# Patient Record
(demographics unavailable — no encounter records)

---

## 2025-04-11 NOTE — PHYSICAL EXAM
[No Acute Distress] : no acute distress [Normal Voice/Communication] : normal voice/communication [Normal Sclera/Conjunctiva] : normal sclera/conjunctiva [EOMI] : extraocular movements intact [Normal Outer Ear/Nose] : the outer ears and nose were normal in appearance [Supple] : supple [No Respiratory Distress] : no respiratory distress  [No Accessory Muscle Use] : no accessory muscle use [Clear to Auscultation] : lungs were clear to auscultation bilaterally [Normal Rate] : normal rate  [Regular Rhythm] : with a regular rhythm [Normal S1, S2] : normal S1 and S2 [No Edema] : there was no peripheral edema [Soft] : abdomen soft [Non Tender] : non-tender [Non-distended] : non-distended [No Joint Swelling] : no joint swelling [No Rash] : no rash [Normal Gait] : normal gait [Speech Grossly Normal] : speech grossly normal [Memory Grossly Normal] : memory grossly normal [Normal Affect] : the affect was normal [Normal Mood] : the mood was normal [Normal Insight/Judgement] : insight and judgment were intact [No Masses] : no abdominal mass palpated [No HSM] : no HSM [Acne] : no acne

## 2025-04-11 NOTE — REVIEW OF SYSTEMS
[Back Pain] : back pain [Fever] : no fever [Chills] : no chills [Discharge] : no discharge [Pain] : no pain [Redness] : no redness [Dryness] : no dryness [Itching] : no itching [Chest Pain] : no chest pain [Shortness Of Breath] : no shortness of breath [Abdominal Pain] : no abdominal pain [Dysuria] : no dysuria [Hematuria] : no hematuria [Joint Pain] : no joint pain [Skin Rash] : no skin rash [Headache] : no headache [Dizziness] : no dizziness [Suicidal] : not suicidal [Insomnia] : no insomnia [Anxiety] : no anxiety [Depression] : no depression [FreeTextEntry3] : New right eye floaters

## 2025-04-11 NOTE — HEALTH RISK ASSESSMENT
[Yes] : In the past 12 months have you used drugs other than those required for medical reasons? Yes [Never] : Never [2 - 4 times a month (2 pts)] : 2-4 times a month (2 points) [1 or 2 (0 pts)] : 1 or 2 (0 points) [Never (0 pts)] : Never (0 points) [Little interest or pleasure doing things] : 1) Little interest or pleasure doing things [Feeling down, depressed, or hopeless] : 2) Feeling down, depressed, or hopeless [0] : 2) Feeling down, depressed, or hopeless: Not at all (0) [PHQ-2 Negative - No further assessment needed] : PHQ-2 Negative - No further assessment needed [de-identified] : Glass of bourbon, beer, wine once in a while  [Audit-CScore] : 2 [de-identified] : Marijuana gummies PRN  [de-identified] : Sedentary but has bought a monitoring ring to measure steps and plans to walk more  [de-identified] : Eats a lot of legumes  [[id number]] : 0

## 2025-04-11 NOTE — HISTORY OF PRESENT ILLNESS
[FreeTextEntry1] : Here to establish primary care and for annual CPE [de-identified] : T2DM - sees endo at [facility] Metformin 500mg 2 tab BID  HLD - on atorva 40mg  HTN - losartan 100mg  Uncontrolled here but did not take his med today  Cialis 5mg PRN erectile dysfunction  Had a fall in [place] a year ago when coming out of a car MRI - was told soft tissue injury Since then get recurrent but mild right lower back pain w/o sciatica  Sleeps well  New right eye floaters x2 weeks   HCM - Colonoscopy: 2019, found a polyp -> would need new GI this year for repeat; of note, recently had one episode of some blood in stool that self resolved - Vaccines: Tdap - unsure / Shingrix - received / PCV - unclear / COVID/Flu - annual booster UTD

## 2025-04-11 NOTE — ASSESSMENT
[Vaccines Reviewed] : Immunizations reviewed today. Please see immunization details in the vaccine log within the immunization flowsheet.  [FreeTextEntry1] : #T2DM - Continue metformin 500mg 2 tab BID - refill for 90 days - Re-check A1C  #HLD - on atorva 40mg - Check lipid panel, Lp(a) - Will refill statin based on above - Will consider CCS  #HTN - Continue losartan 100mg   #Erectile dysfunction - Continue Cialis 5mg PRN  #HCM - Colonoscopy: 2019, found a polyp -> would need new GI this year for repeat; of note, recently had one episode of some blood in stool that self-resolved - Vaccines: Tdap - unsure / Shingrix - received / PCV - unclear / COVID/Flu - annual booster UTD

## 2025-04-11 NOTE — HISTORY OF PRESENT ILLNESS
[FreeTextEntry1] : Here to establish primary care and for annual CPE [de-identified] : T2DM - sees endo at [facility] Metformin 500mg 2 tab BID  HLD - on atorva 40mg  HTN - losartan 100mg  Uncontrolled here but did not take his med today  Cialis 5mg PRN erectile dysfunction  Had a fall in [place] a year ago when coming out of a car MRI - was told soft tissue injury Since then get recurrent but mild right lower back pain w/o sciatica  Sleeps well  New right eye floaters x2 weeks   HCM - Colonoscopy: 2019, found a polyp -> would need new GI this year for repeat; of note, recently had one episode of some blood in stool that self resolved - Vaccines: Tdap - unsure / Shingrix - received / PCV - unclear / COVID/Flu - annual booster UTD

## 2025-04-11 NOTE — HEALTH RISK ASSESSMENT
[Yes] : In the past 12 months have you used drugs other than those required for medical reasons? Yes [Never] : Never [2 - 4 times a month (2 pts)] : 2-4 times a month (2 points) [1 or 2 (0 pts)] : 1 or 2 (0 points) [Never (0 pts)] : Never (0 points) [Little interest or pleasure doing things] : 1) Little interest or pleasure doing things [Feeling down, depressed, or hopeless] : 2) Feeling down, depressed, or hopeless [0] : 2) Feeling down, depressed, or hopeless: Not at all (0) [PHQ-2 Negative - No further assessment needed] : PHQ-2 Negative - No further assessment needed [de-identified] : Glass of bourbon, beer, wine once in a while  [Audit-CScore] : 2 [de-identified] : Marijuana gummies PRN  [de-identified] : Sedentary but has bought a monitoring ring to measure steps and plans to walk more  [de-identified] : Eats a lot of legumes  [[id number]] : 0

## 2025-05-12 NOTE — ASSESSMENT
[FreeTextEntry1] : #HLD LDL 87,  / Lp(a) 216.7 nmol/L On atorvastatin 40mg  - Increase to atorvastatin 80mg w/ goal LDL<70 - Obtain CCS - Reduce saturated fats and increase fiber in diet; dietitian referral offered but currently declines  #ED - Continue Cialis PRN  #T2DM A1C 6.5% #BMI>33 - Continue metformin ER 500mg 2 tabs BID - Start Mounjaro 2.5mg  #PVD OD - F/u w/ ophthal 5/19 for dilated exam OD  #HTN - improved - Continue losartan 100mg  #HCM - Colonoscopy: 2019, +polyp -> due for repeat COL this year; of note, recently had one episode of some blood in stool that self-resolved; has appt donita/ [facility] now - Vaccines: Tdap - unsure / Shingrix - received / PCV - unclear / COVID/Flu - annual booster UTD

## 2025-05-12 NOTE — PHYSICAL EXAM
[No Acute Distress] : no acute distress [Well Nourished] : well nourished [Well Developed] : well developed [Well-Appearing] : well-appearing [Normal Voice/Communication] : normal voice/communication [Normal Sclera/Conjunctiva] : normal sclera/conjunctiva [EOMI] : extraocular movements intact [Normal Outer Ear/Nose] : the outer ears and nose were normal in appearance [Supple] : supple [No Respiratory Distress] : no respiratory distress  [Normal Rate] : normal rate  [No Edema] : there was no peripheral edema [Normal Gait] : normal gait [Alert and Oriented x3] : oriented to person, place, and time [Normal Insight/Judgement] : insight and judgment were intact

## 2025-05-12 NOTE — HISTORY OF PRESENT ILLNESS
[FreeTextEntry1] : Follow up on labs re: HLD, elevated Lp(a), T2DM [de-identified] : #HLD LDL 87,  Lp(a) 216.7 nmol/L On atorvastatin 40mg  On Cialis PRN for ED  #T2DM A1C 6.5% Metformin ER 500mg 2 tabs BID Saw ophthal Apr 2025: PVD OD, was asked to f/u in 3-4 weeks for dilated exam OD Given BMI>33, GLP1 discussed No personal history of pancreatitis or MEN2 syndrome, no personal or family hx of medullary thyroid cancer AST/ALT 16/23 wnl   #HTN - improved On losartan 100mg UACR wnl  #HCM - Colonoscopy: 2019, +polyp -> due for repeat COL this year; of note, recently had one episode of some blood in stool that self-resolved; has appt donita/ [facility] now - Vaccines: Tdap - unsure / Shingrix - received / PCV - unclear / COVID/Flu - annual booster UTD

## 2025-05-12 NOTE — HISTORY OF PRESENT ILLNESS
[FreeTextEntry1] : Follow up on labs re: HLD, elevated Lp(a), T2DM [de-identified] : #HLD LDL 87,  Lp(a) 216.7 nmol/L On atorvastatin 40mg  On Cialis PRN for ED  #T2DM A1C 6.5% Metformin ER 500mg 2 tabs BID Saw ophthal Apr 2025: PVD OD, was asked to f/u in 3-4 weeks for dilated exam OD Given BMI>33, GLP1 discussed No personal history of pancreatitis or MEN2 syndrome, no personal or family hx of medullary thyroid cancer AST/ALT 16/23 wnl   #HTN - improved On losartan 100mg UACR wnl  #HCM - Colonoscopy: 2019, +polyp -> due for repeat COL this year; of note, recently had one episode of some blood in stool that self-resolved; has appt donita/ [facility] now - Vaccines: Tdap - unsure / Shingrix - received / PCV - unclear / COVID/Flu - annual booster UTD

## 2025-05-12 NOTE — REVIEW OF SYSTEMS
[Chest Pain] : no chest pain [Shortness Of Breath] : no shortness of breath [Abdominal Pain] : no abdominal pain [Nausea] : no nausea [Constipation] : no constipation [Diarrhea] : no diarrhea [Vomiting] : no vomiting [Heartburn] : no heartburn [Melena] : no melena

## 2025-06-19 NOTE — ASSESSMENT
[FreeTextEntry1] : # Hx of colon polyps # CRC in father in 70s -Counseled on COL indications, alternates, benefits, and risks (including bleeding, infection, and perforation)  -Schedule COL at [facility] with Golytely -Counseled on procedure benefits, risks, need for escort, and prep  # Heartburn - mild -Counseled on dietary mod, lifestyle mod, cont PRN pepto  FU post op PRN

## 2025-07-17 NOTE — HISTORY OF PRESENT ILLNESS
[FreeTextEntry1] : T2DM A1C 6.5% Class 1 obesity BMI>33 [de-identified] : #HLD LDL 87, , Lp(a) 216.7 nmol/L CT calcium score 1.48 06/2025 On atorvastatin 40mg -> 80mg On Cialis PRN for ED  #T2DM A1C 6.5% #Class 1 obesity BMI>33 Mounjaro 2.5mg for 4 weeks then 5mg for 4 weeks, now f/u Continuing metformin ER 500mg 2 tabs BID Diet: Bowl of bran flakes for breakfast, lunch lentil soup w/ salad or pork w/ beans and rice, dinner - barbecued jerk chicken w/ veggies  Exercise: Going to the gym for strength training  Saw robin June 2025  #HTN - improved On losartan 100mg UACR wnl <30  #HCM - Colonoscopy: 2019, +polyp -> due for repeat COL this year, saw RADHA Lozano and is planned for COL in Aug - Vaccines: Tdap - unsure / Shingrix - received / PCV - unclear / COVID/Flu - annual booster UTD

## 2025-07-17 NOTE — HISTORY OF PRESENT ILLNESS
[FreeTextEntry1] : T2DM A1C 6.5% Class 1 obesity BMI>33 [de-identified] : #HLD LDL 87, , Lp(a) 216.7 nmol/L CT calcium score 1.48 06/2025 On atorvastatin 40mg -> 80mg On Cialis PRN for ED  #T2DM A1C 6.5% #Class 1 obesity BMI>33 Mounjaro 2.5mg for 4 weeks then 5mg for 4 weeks, now f/u Continuing metformin ER 500mg 2 tabs BID Diet: Bowl of bran flakes for breakfast, lunch lentil soup w/ salad or pork w/ beans and rice, dinner - barbecued jerk chicken w/ veggies  Exercise: Going to the gym for strength training  Saw robin June 2025  #HTN - improved On losartan 100mg UACR wnl <30  #HCM - Colonoscopy: 2019, +polyp -> due for repeat COL this year, saw RADHA Lozano and is planned for COL in Aug - Vaccines: Tdap - unsure / Shingrix - received / PCV - unclear / COVID/Flu - annual booster UTD

## 2025-07-17 NOTE — ASSESSMENT
[FreeTextEntry1] : #HLD LDL 87, , Lp(a) 216.7 nmol/L CT calcium score 1.48 06/2025 - Continue atorvastatin 80mg - On Cialis PRN for ED  #T2DM A1C 6.5% #Class 1 obesity BMI>33 - Continue Mounjaro 5mg, refill for 12 weeks, f/u before next refill - Continue metformin ER 500mg 2 tabs BID - UTD on ophthal check - Advised podiatry check   #HTN - improved - Continue losartan 100mg  #HCM - Colonoscopy: 2019, +polyp -> due for repeat COL this year, saw GI Dr. Lozano and is planned for COL in Aug - Vaccines: Tdap - unsure / Shingrix - received / PCV - unclear / COVID/Flu - annual booster UTD

## 2025-07-17 NOTE — HISTORY OF PRESENT ILLNESS
[FreeTextEntry1] : T2DM A1C 6.5% Class 1 obesity BMI>33 [de-identified] : #HLD LDL 87, , Lp(a) 216.7 nmol/L CT calcium score 1.48 06/2025 On atorvastatin 40mg -> 80mg On Cialis PRN for ED  #T2DM A1C 6.5% #Class 1 obesity BMI>33 Mounjaro 2.5mg for 4 weeks then 5mg for 4 weeks, now f/u Continuing metformin ER 500mg 2 tabs BID Diet: Bowl of bran flakes for breakfast, lunch lentil soup w/ salad or pork w/ beans and rice, dinner - barbecued jerk chicken w/ veggies  Exercise: Going to the gym for strength training  Saw robin June 2025  #HTN - improved On losartan 100mg UACR wnl <30  #HCM - Colonoscopy: 2019, +polyp -> due for repeat COL this year, saw RADHA Lozano and is planned for COL in Aug - Vaccines: Tdap - unsure / Shingrix - received / PCV - unclear / COVID/Flu - annual booster UTD